# Patient Record
Sex: MALE | Race: BLACK OR AFRICAN AMERICAN | NOT HISPANIC OR LATINO | Employment: FULL TIME | URBAN - METROPOLITAN AREA
[De-identification: names, ages, dates, MRNs, and addresses within clinical notes are randomized per-mention and may not be internally consistent; named-entity substitution may affect disease eponyms.]

---

## 2017-11-17 ENCOUNTER — GENERIC CONVERSION - ENCOUNTER (OUTPATIENT)
Dept: OTHER | Facility: OTHER | Age: 37
End: 2017-11-17

## 2017-11-28 ENCOUNTER — GENERIC CONVERSION - ENCOUNTER (OUTPATIENT)
Dept: OTHER | Facility: OTHER | Age: 37
End: 2017-11-28

## 2017-12-18 ENCOUNTER — ALLSCRIPTS OFFICE VISIT (OUTPATIENT)
Dept: OTHER | Facility: OTHER | Age: 37
End: 2017-12-18

## 2018-01-22 VITALS
RESPIRATION RATE: 14 BRPM | WEIGHT: 241 LBS | DIASTOLIC BLOOD PRESSURE: 120 MMHG | TEMPERATURE: 97.5 F | BODY MASS INDEX: 35.7 KG/M2 | HEART RATE: 78 BPM | SYSTOLIC BLOOD PRESSURE: 170 MMHG | HEIGHT: 69 IN

## 2018-01-23 NOTE — MISCELLANEOUS
Provider Comments  Provider Comments:   Patient did not show up for her appointment today so I called and left a message for him to call back and reschedule        Signatures   Electronically signed by : INDIGO Cottrell; Dec 18 2017  8:22AM EST                       (Author)

## 2018-02-04 PROBLEM — M25.561 ACUTE PAIN OF RIGHT KNEE: Status: ACTIVE | Noted: 2017-11-17

## 2018-02-04 PROBLEM — I10 BENIGN ESSENTIAL HYPERTENSION: Status: ACTIVE | Noted: 2017-11-17

## 2018-02-04 PROBLEM — F17.200 TOBACCO DEPENDENCE: Status: ACTIVE | Noted: 2017-11-17

## 2018-02-04 RX ORDER — OMEPRAZOLE 40 MG/1
1 CAPSULE, DELAYED RELEASE ORAL DAILY
COMMUNITY
Start: 2017-11-17

## 2018-02-04 RX ORDER — AMLODIPINE BESYLATE AND BENAZEPRIL HYDROCHLORIDE 5; 10 MG/1; MG/1
1 CAPSULE ORAL DAILY
COMMUNITY
Start: 2017-11-17 | End: 2018-02-09

## 2018-02-04 RX ORDER — IBUPROFEN 800 MG/1
1 TABLET ORAL 3 TIMES DAILY PRN
COMMUNITY
Start: 2017-11-17 | End: 2018-02-20

## 2018-02-04 RX ORDER — NAPROXEN 500 MG/1
1 TABLET ORAL EVERY 12 HOURS PRN
COMMUNITY
Start: 2017-11-17

## 2018-02-05 ENCOUNTER — OFFICE VISIT (OUTPATIENT)
Dept: FAMILY MEDICINE CLINIC | Facility: CLINIC | Age: 38
End: 2018-02-05
Payer: COMMERCIAL

## 2018-02-05 VITALS
TEMPERATURE: 97.7 F | HEIGHT: 70 IN | RESPIRATION RATE: 12 BRPM | DIASTOLIC BLOOD PRESSURE: 100 MMHG | WEIGHT: 247 LBS | BODY MASS INDEX: 35.36 KG/M2 | HEART RATE: 72 BPM | SYSTOLIC BLOOD PRESSURE: 170 MMHG

## 2018-02-05 DIAGNOSIS — G47.33 OSA (OBSTRUCTIVE SLEEP APNEA): ICD-10-CM

## 2018-02-05 DIAGNOSIS — Z01.818 PREOPERATIVE CLEARANCE: Primary | ICD-10-CM

## 2018-02-05 DIAGNOSIS — I10 HYPERTENSION, MALIGNANT: ICD-10-CM

## 2018-02-05 DIAGNOSIS — M51.26 LUMBAR DISC HERNIATION: ICD-10-CM

## 2018-02-05 PROCEDURE — 99243 OFF/OP CNSLTJ NEW/EST LOW 30: CPT | Performed by: NURSE PRACTITIONER

## 2018-02-05 RX ORDER — ALBUTEROL SULFATE 90 UG/1
2 AEROSOL, METERED RESPIRATORY (INHALATION)
COMMUNITY

## 2018-02-05 RX ORDER — IBUPROFEN 800 MG/1
800 TABLET ORAL
COMMUNITY
End: 2018-02-05 | Stop reason: SDUPTHER

## 2018-02-05 NOTE — PATIENT INSTRUCTIONS

## 2018-02-05 NOTE — PROGRESS NOTES
Subjective:     Mayito Jules is a 40 y o  male who presents to the office today for a preoperative consultation at the request of surgeon Dr Pooja Banda who plans on performing L5-S1 right sided microdiscectomy, possible laminectomy on February 6  This consultation is requested for the specific conditions prompting preoperative evaluation (i e  because of potential affect on operative risk): uncontrolled blood pressure, untreated sleep apnea  Planned anesthesia: general  The patient has the following known anesthesia issues: unknown  Patients bleeding risk: no recent abnormal bleeding, no remote history of abnormal bleeding and use of Ca-channel blockers (see med list)  Patient does not have objections to receiving blood products if needed  Patient seen by me in November 2017 for significant HTN sent by [facility] comp doctor  BP was 180/120 at time of office visit  Amlodipine-benazapril 5-10mg ordered  Patient was told to return for BP check and labs in Dec 2017  He failed to p/u meds from pharm  He did not start meds  He failed to return to my office for BP f/u  Additionally, patient reports sleep study for life insurance policy  Patient reports result showing mild sleep apnea  He reports that he opted not to treat  The following portions of the patient's history were reviewed and updated as appropriate: allergies, current medications, past family history, past medical history, past social history, past surgical history and problem list     Review of Systems  A comprehensive review of systems was negative except for: Musculoskeletal: positive for arthralgias  Neurological: positive for Symptoms;  Neuro: headaches     Objective:     /100 (BP Location: Left arm, Patient Position: Sitting, Cuff Size: Large)   Pulse 72   Temp 97 7 °F (36 5 °C) (Temporal)   Resp 12   Ht 5' 9 5" (1 765 m)   Wt 112 kg (247 lb)   BMI 35 95 kg/m²   General appearance: alert and oriented, in no acute distress  Head: Normocephalic, without obvious abnormality, atraumatic  Eyes: conjunctivae/corneas clear  PERRL, EOM's intact  Fundi benign  Throat: lips, mucosa, and tongue normal; teeth and gums normal  Neck: no adenopathy, supple, symmetrical, trachea midline and thyroid not enlarged, symmetric, no tenderness/mass/nodules  Lungs: clear to auscultation bilaterally  Heart: regular rate and rhythm, S1, S2 normal, no murmur, click, rub or gallop  Abdomen: soft, non-tender; bowel sounds normal; no masses,  no organomegaly  Extremities: extremities normal, warm and well-perfused; no cyanosis, clubbing, or edema  Pulses: 2+ and symmetric  Lymph nodes: Cervical, supraclavicular, and axillary nodes normal   Neurologic: Grossly normal      Cardiographics  ECG: unable to preform d/t significant amount of lotion on skin  Unable to prep skin adequately to acheive EKG  Electrode patches would not stick to skin    Imaging  Chest x-ray: not performed     Lab Review     Not received yet from [facility]      Assessment:     40 y o  male with planned surgery as above  Known risk factors for perioperative complications: untreated HTN and sleep apnea 2' noncomplance    Patient seen by me in November 2017 for significant HTN sent by [facility] comp doctor  BP was 180/120 at time of office visit  Amlodipine-benazapril 5-10mg ordered  Patient was told to return for BP check and labs in Dec 2017  He failed to p/u meds from pharm  He did not start meds  He failed to return to my office for BP f/u  Additionally, patient reports sleep study for life insurance policy  Patient reports result showing mild sleep apnea  He reports that he opted not to treat  Plan:  Patient DENIED clearance for planned procedure secondary to uncontrolled hypertension and untreated sleep apnea  1  Preoperative workup as follows EKG, cardiac and pulmonology clearance   Patient must follow treatment plan for above conditions as manifested by optimized blood pressures      Patient advised to return for medical clearance once he has seen specialists and has received cardiac and pulmonary clearance

## 2018-02-09 ENCOUNTER — OFFICE VISIT (OUTPATIENT)
Dept: CARDIOLOGY CLINIC | Facility: CLINIC | Age: 38
End: 2018-02-09
Payer: COMMERCIAL

## 2018-02-09 VITALS
WEIGHT: 245 LBS | BODY MASS INDEX: 35.07 KG/M2 | OXYGEN SATURATION: 100 % | HEIGHT: 70 IN | SYSTOLIC BLOOD PRESSURE: 162 MMHG | HEART RATE: 68 BPM | DIASTOLIC BLOOD PRESSURE: 98 MMHG

## 2018-02-09 DIAGNOSIS — Z01.818 PRE-OPERATIVE EXAM: ICD-10-CM

## 2018-02-09 DIAGNOSIS — G47.30 SLEEP APNEA IN ADULT: ICD-10-CM

## 2018-02-09 DIAGNOSIS — I10 BENIGN ESSENTIAL HYPERTENSION: ICD-10-CM

## 2018-02-09 DIAGNOSIS — I10 MALIGNANT HYPERTENSION: Primary | ICD-10-CM

## 2018-02-09 DIAGNOSIS — F17.200 TOBACCO DEPENDENCE: ICD-10-CM

## 2018-02-09 DIAGNOSIS — I10 HYPERTENSION, UNSPECIFIED TYPE: Primary | ICD-10-CM

## 2018-02-09 DIAGNOSIS — Z82.3 FAMILY HISTORY OF STROKE: ICD-10-CM

## 2018-02-09 PROCEDURE — 99245 OFF/OP CONSLTJ NEW/EST HI 55: CPT | Performed by: INTERNAL MEDICINE

## 2018-02-09 PROCEDURE — 93000 ELECTROCARDIOGRAM COMPLETE: CPT | Performed by: INTERNAL MEDICINE

## 2018-02-09 RX ORDER — OLMESARTAN MEDOXOMIL, AMLODIPINE AND HYDROCHLOROTHIAZIDE TABLET 40/10/25 MG 40; 10; 25 MG/1; MG/1; MG/1
TABLET ORAL
Qty: 90 TABLET | Refills: 3 | Status: SHIPPED | OUTPATIENT
Start: 2018-02-09 | End: 2018-02-14

## 2018-02-09 RX ORDER — OLMESARTAN MEDOXOMIL AND HYDROCHLOROTHIAZIDE 40/12.5 40; 12.5 MG/1; MG/1
1 TABLET ORAL DAILY
Qty: 42 TABLET | Refills: 0 | Status: SHIPPED | COMMUNITY
Start: 2018-02-09 | End: 2018-02-14

## 2018-02-09 RX ORDER — AMLODIPINE BESYLATE 10 MG/1
TABLET ORAL
Qty: 30 TABLET | Refills: 0 | Status: SHIPPED | OUTPATIENT
Start: 2018-02-09 | End: 2018-02-14

## 2018-02-09 RX ORDER — AMLODIPINE BESYLATE AND BENAZEPRIL HYDROCHLORIDE 5; 10 MG/1; MG/1
CAPSULE ORAL
Qty: 180 CAPSULE | Refills: 3 | Status: CANCELLED | OUTPATIENT
Start: 2018-02-09

## 2018-02-09 NOTE — TELEPHONE ENCOUNTER
[employer] comp will not cover olmesartan-amlodipine-hctz since the hypertension is not caused by the injury  Pt will  benicar/hct samples and get amlodipine from pharmacy per dr Guilherme [place] script

## 2018-02-09 NOTE — PROGRESS NOTES
Cardiology Outpatient Consultation                                                           Jonas Suarez  [id number]  1980      Consult for:  Appreciate consult by: Destin Narvaez MD    1  Hypertension, unspecified type  POCT ECG   2  Pre-operative exam     3  Family history of stroke     4  Tobacco dependence     5  Benign essential hypertension         Discussion/Summary:  Malignant hypertension/abnormal EKG-he feels the amlodipine plus benazepril it is not effectively working  He wants to try Benicar which he was on previously  He was given samples of Benicar and HCTZ  Pick amlodipine 10 mg pills  He will then take triple therapy  We are targeting an blood pressures below 140/90  He will call back in 1 week with his blood pressure    Preoperative-he has not nonspecific inferior T-wave changes  He is able to walk up 2 flights of stairs and 4 blocks without significant chest pain or shortness of breath  Currently he has suboptimal blood pressure control  If his blood pressure is better controlled he will be low cardiac risk for intermediate risk procedure    Current smoking-with the patient discussed smoking cessation    History of sleep apnea  --discussed with the patient the importance need for treating his sleep apnea given his occupation   --recommend consideration for CPAP plus weight loss      HPI:  73 yo gentleman hx smoking, family history of strokes who who has been pending back surgery  He reports having multiple visits where his blood pressure was elevated  He previously was not compliant with his blood pressure medication  He reports also a lot of salt use secondary to fast food intake  He has not been watching his diet  He also has been screened positive for sleep apnea  However he he does not want to use CPAP mask  He denies having significant shortness of breath on walking up 2 flights of stairs or 4 blocks    He denies having any prior history of substernal chest pain  He does report having strong family history for strokes  He just restarted taking medications in the past month  PMH  Herniated disc for her back  Malignant htn-    Social Hx  GF, lives in [place] [facility] [place]  Smoking- 15 years- tried cutting back  Alcohol- maybe once a week  Apnea- mild      Family Hx  Reports family history of strokes      Past Medical History:   Diagnosis Date    Hypertension     Right knee sprain      Social History     Social History    Marital status: Single     Spouse name: N/A    Number of children: N/A    Years of education: N/A     Occupational History    Not on file       Social History Main Topics    Smoking status: Current Every Day Smoker     Packs/day: 0 50    Smokeless tobacco: Never Used    Alcohol use Yes      Comment: Occasional    Drug use: No    Sexual activity: Not on file     Other Topics Concern    Not on file     Social History Narrative    Daily caffeinated coffee consumption    Exercise twice/month    Sleep lack - 5 hours night      Family History   Problem Relation Age of Onset    Diabetes Mother     Cancer Mother      uterine    Diabetes Father     Stroke Father     Hypertension Father     Diabetes Maternal Grandmother      Past Surgical History:   Procedure Laterality Date    GASTRECTOMY SLEEVE LAPAROSCOPIC         Current Outpatient Prescriptions:     amLODIPine-benazepril (LOTREL 5-10) 5-10 MG per capsule, Take 1 capsule by mouth daily, Disp: , Rfl:     ibuprofen (MOTRIN) 800 mg tablet, Take 1 tablet by mouth 3 (three) times a day as needed, Disp: , Rfl:     naproxen (NAPROSYN) 500 mg tablet, Take 1 tablet by mouth every 12 (twelve) hours as needed, Disp: , Rfl:     albuterol (PROVENTIL HFA,VENTOLIN HFA) 90 mcg/act inhaler, Inhale 2 puffs, Disp: , Rfl:     omeprazole (PriLOSEC) 40 MG capsule, Take 1 capsule by mouth daily, Disp: , Rfl:   Allergies   Allergen Reactions    Other      Grass, mold    Pollen Extract      Vitals:    02/09/18 1109   BP: 162/98   BP Location: Left arm   Patient Position: Sitting   Cuff Size: Large   Pulse: 68   SpO2: 100%   Weight: 111 kg (245 lb)   Height: 5' 9 5" (1 765 m)       Review of Systems:  Review of Systems   Constitutional: Positive for fatigue  HENT: Negative  Eyes: Negative  Respiratory: Negative  Cardiovascular: Negative  Gastrointestinal: Negative  Endocrine: Negative  Genitourinary: Negative  Musculoskeletal: Negative  Skin: Negative  Allergic/Immunologic: Negative  Neurological: Negative  Hematological: Negative  Psychiatric/Behavioral: Negative  Vitals:    02/09/18 1109   BP: 162/98   BP Location: Left arm   Patient Position: Sitting   Cuff Size: Large   Pulse: 68   SpO2: 100%   Weight: 111 kg (245 lb)   Height: 5' 9 5" (1 765 m)     Physical Exam:  Physical Exam   Constitutional: He is oriented to person, place, and time  He appears well-developed and well-nourished  No distress  HENT:   Head: Normocephalic and atraumatic  Right Ear: External ear normal    Left Ear: External ear normal    Eyes: Conjunctivae are normal  Pupils are equal, round, and reactive to light  Right eye exhibits no discharge  Left eye exhibits no discharge  No scleral icterus  Neck: Normal range of motion  Neck supple  No JVD present  No tracheal deviation present  No thyromegaly present  Cardiovascular: Normal rate and regular rhythm  Exam reveals gallop  Exam reveals no friction rub  No murmur heard  Pulmonary/Chest: Effort normal and breath sounds normal  No stridor  No respiratory distress  He has no wheezes  He has no rales  He exhibits no tenderness  Abdominal: Soft  Bowel sounds are normal  He exhibits no distension and no mass  There is no tenderness  There is no rebound and no guarding  Musculoskeletal: Normal range of motion  He exhibits no edema, tenderness or deformity     Neurological: He is alert and oriented to person, place, and time  He has normal reflexes  No cranial nerve deficit  He exhibits normal muscle tone  Coordination normal    Skin: Skin is warm and dry  No rash noted  He is not diaphoretic  No erythema  No pallor  Psychiatric: He has a normal mood and affect  His behavior is normal  Judgment and thought content normal        Labs:   CBC with diff:      Invalid input(s):  RBC, TOTALCELLSCOUNTED, SEGS%, GRANS%, LYMPHS%, EOS%, BASO%, ABNEUT, ABGRANS, ABLYMPHS, ABMOMOS, ABEOS, ABBASO    CMP:      Magnesium:    Coags:    TSH:    Lipid Profile:    Hgb A1c:    NT-proBNP: No results for input(s): NTBNP in the last 72 hours  Imaging & Testing   I have personally reviewed pertinent reports  EKG: Personally reviewed  Normal sinus rhythm with nonspecific T-wave changes    Mitesh Ross MD [facility] [phone]  Please call with any questions or suggestions    Counseling :  A description of the counselin minutes spent on dietary changes plus sleep apnea treatment recommendations  Goals and Barriers:  Patient's ability to self care:  Medication side effect reviewed with patient in detail and all their questions answered  "This note has been constructed using a voice recognition system  Therefore there may be syntax, spelling, and/or grammatical errors   Please call if you have any questions  "

## 2018-02-14 ENCOUNTER — TELEPHONE (OUTPATIENT)
Dept: CARDIOLOGY CLINIC | Facility: CLINIC | Age: 38
End: 2018-02-14

## 2018-02-15 NOTE — TELEPHONE ENCOUNTER
His losartan 50-hctz 12 5mg daily and his blood pressure is much better  He is low cardiac risk if his blood pressure controlled  He has mild sleep apnea and will need to be monitored post-op

## 2018-02-20 ENCOUNTER — OFFICE VISIT (OUTPATIENT)
Dept: FAMILY MEDICINE CLINIC | Facility: CLINIC | Age: 38
End: 2018-02-20
Payer: COMMERCIAL

## 2018-02-20 VITALS
WEIGHT: 238 LBS | RESPIRATION RATE: 16 BRPM | DIASTOLIC BLOOD PRESSURE: 90 MMHG | BODY MASS INDEX: 34.07 KG/M2 | HEART RATE: 72 BPM | HEIGHT: 70 IN | TEMPERATURE: 97.4 F | SYSTOLIC BLOOD PRESSURE: 130 MMHG

## 2018-02-20 DIAGNOSIS — I10 BENIGN ESSENTIAL HYPERTENSION: Primary | ICD-10-CM

## 2018-02-20 PROCEDURE — 99213 OFFICE O/P EST LOW 20 MIN: CPT | Performed by: NURSE PRACTITIONER

## 2018-02-20 RX ORDER — IBUPROFEN 800 MG/1
600 TABLET ORAL
COMMUNITY

## 2018-02-20 RX ORDER — AMLODIPINE BESYLATE AND BENAZEPRIL HYDROCHLORIDE 5; 10 MG/1; MG/1
1 CAPSULE ORAL
COMMUNITY

## 2018-02-20 NOTE — PROGRESS NOTES
Subjective:     Van Cogan is a 41 y o  male who returns to the office today for a preoperative consultation at the request of surgeon Dr Sarahy Banuelos who plans on performing L5-S1 right sided microdiscectomy, possible laminectomy  Surgical clearance denied on 2/5/18 d/t uncontrolled Bps  He returns today  Reports compliance with BP med  Has been checking bps at home with averages 110-120/70-80s  Cardiac clearance per Dr Felipa Sifuentes  Planned anesthesia: general  The patient has the following known anesthesia issues: unknown  Patients bleeding risk: no recent abnormal bleeding, no remote history of abnormal bleeding and use of Ca-channel blockers (see med list)  Patient does not have objections to receiving blood products if needed  The following portions of the patient's history were reviewed and updated as appropriate: allergies, current medications, past family history, past medical history, past social history, past surgical history and problem list     Review of Systems  A comprehensive review of systems was negative except for: Musculoskeletal: positive for arthralgias  Neurological: positive for Symptoms; Neuro: headaches     Objective:     /90 (BP Location: Left arm, Patient Position: Sitting, Cuff Size: Large)   Pulse 72   Temp (!) 97 4 °F (36 3 °C) (Tympanic)   Resp 16   Ht 5' 9 5" (1 765 m)   Wt 108 kg (238 lb)   BMI 34 64 kg/m²   General appearance: alert and oriented, in no acute distress  Head: Normocephalic, without obvious abnormality, atraumatic  Eyes: conjunctivae/corneas clear  PERRL, EOM's intact  Fundi benign    Throat: lips, mucosa, and tongue normal; teeth and gums normal  Neck: no adenopathy, supple, symmetrical, trachea midline and thyroid not enlarged, symmetric, no tenderness/mass/nodules  Lungs: clear to auscultation bilaterally  Heart: regular rate and rhythm, S1, S2 normal, no murmur, click, rub or gallop  Abdomen: soft, non-tender; bowel sounds normal; no masses,  no organomegaly  Extremities: extremities normal, warm and well-perfused; no cyanosis, clubbing, or edema  Pulses: 2+ and symmetric  Lymph nodes: Cervical, supraclavicular, and axillary nodes normal   Neurologic: Grossly normal      Cardiographics  ECG: NSR with nonspecific t wave changes  Imaging  Chest x-ray: not performed     Lab Review   CBC, CMP, Ua, coags in acceptable ranges    Assessment:     41 y o  male with planned surgery as above  Known risk factors for perioperative complications: HTN, mild mahad   Patient has been compliant with blood pressure treatment plan  Blood pressure controlled during today's office visit  Cleared at low-moderate cardiac risk without additional cardiac testing  He must continue to take blood pressure meds as prescribed  Take med with small sip of water on morning of surgery   Advised [place]-operative monitoring of mahad by anesthesia team

## 2020-10-12 ENCOUNTER — TELEPHONE (OUTPATIENT)
Dept: DERMATOLOGY | Facility: CLINIC | Age: 40
End: 2020-10-12

## 2021-01-06 ENCOUNTER — TELEPHONE (OUTPATIENT)
Dept: DERMATOLOGY | Age: 41
End: 2021-01-06

## 2021-02-11 ENCOUNTER — TELEPHONE (OUTPATIENT)
Dept: DERMATOLOGY | Facility: CLINIC | Age: 41
End: 2021-02-11

## 2021-02-11 NOTE — TELEPHONE ENCOUNTER
Called patient from the wait list to schedule an appointment in our [facility] office, he denies needing an appointment at this time and would like to be off the wait list